# Patient Record
Sex: MALE | Race: WHITE
[De-identification: names, ages, dates, MRNs, and addresses within clinical notes are randomized per-mention and may not be internally consistent; named-entity substitution may affect disease eponyms.]

---

## 2020-01-02 ENCOUNTER — HOSPITAL ENCOUNTER (EMERGENCY)
Dept: HOSPITAL 76 - ED | Age: 35
Discharge: HOME | End: 2020-01-02
Payer: COMMERCIAL

## 2020-01-02 VITALS — SYSTOLIC BLOOD PRESSURE: 147 MMHG | DIASTOLIC BLOOD PRESSURE: 80 MMHG

## 2020-01-02 DIAGNOSIS — J02.9: Primary | ICD-10-CM

## 2020-01-02 PROCEDURE — 87081 CULTURE SCREEN ONLY: CPT

## 2020-01-02 PROCEDURE — 87661 TRICHOMONAS VAGINALIS AMPLIF: CPT

## 2020-01-02 PROCEDURE — 81599 UNLISTED MAAA: CPT

## 2020-01-02 PROCEDURE — 87491 CHLMYD TRACH DNA AMP PROBE: CPT

## 2020-01-02 PROCEDURE — 87591 N.GONORRHOEAE DNA AMP PROB: CPT

## 2020-01-02 PROCEDURE — 99284 EMERGENCY DEPT VISIT MOD MDM: CPT

## 2020-01-02 PROCEDURE — 99283 EMERGENCY DEPT VISIT LOW MDM: CPT

## 2020-01-02 NOTE — ED PHYSICIAN DOCUMENTATION
PD HPI URI





- Stated complaint


Stated Complaint: SORE THROAT





- Chief complaint


Chief Complaint: Heent





- History obtained from


History obtained from: Patient





- History of Present Illness


Timing - onset: How many days ago (few)


Timing duration: Days (few)


Timing details: Gradual onset, Still present


Associated symptoms: Sore throat, Swollen nodes, Dry cough.  No: Fever, Nasal 

congestion, Rhinorrhea, Productive cough, NVD


Contributing factors: No: Sick contact (he has his son every other weekend, but 

his son had not had tonsillitis symptoms.)


Similar symptoms before: Has not had sx before





Review of Systems


Constitutional: denies: Fever, Chills


Nose: denies: Rhinorrhea / runny nose, Congestion


Throat: reports: Sore throat


Respiratory: reports: Cough.  denies: Dyspnea


GI: denies: Nausea, Vomiting





PD PAST MEDICAL HISTORY





- Past Medical History


Past Medical History: No





- Present Medications


Home Medications: 


                                Ambulatory Orders











 Medication  Instructions  Recorded  Confirmed


 


Cephalexin [Keflex] 1,000 mg PO BID #28 capsule 01/02/20 


 


Diphenhydramine HCl [Allergy 12.5 mg PO Q6H PRN #120 ml 01/02/20 





Relief]   


 


dexAMETHasone [Decadron] 4 mg PO DAILY #5 tablet 01/02/20 














- Allergies


Allergies/Adverse Reactions: 


                                    Allergies











Allergy/AdvReac Type Severity Reaction Status Date / Time


 


No Known Drug Allergies Allergy   Verified 01/02/20 09:05














PD ED PE NORMAL





- Vitals


Vital signs reviewed: Yes





- General


General: Alert and oriented X 3, Well developed/nourished





- HEENT


HEENT: Ears normal, Moist mucous membranes.  No: Pharynx benign (posterior 

pharynx, including edge of soft palatte, lower nasopharynx, and the tonsils too 

with exudate. No vesicles. )





- Neck


Neck: Supple, no meningeal sign, Other (anterior adenopathy. )





- Cardiac


Cardiac: RRR, No murmur





- Respiratory


Respiratory: Clear bilaterally





- Derm


Derm: Normal color, Warm and dry





Results





- Vitals


Vitals: 


                               Vital Signs - 24 hr











  01/02/20





  09:05


 


Temperature 36.4 C L


 


Heart Rate 86


 


Respiratory 16





Rate 


 


Blood Pressure 147/80 H


 


O2 Saturation 100








                                     Oxygen











O2 Source                      Room air

















PD MEDICAL DECISION MAKING





- ED course


Complexity details: considered differential (diffusely exudative, not just 

tonsils, and given age demographic, I did GC culture as well. ), d/w patient





Departure





- Departure


Disposition: 01 Home, Self Care


Clinical Impression: 


 Exudative pharyngitis





Condition: Stable


Record reviewed to determine appropriate education?: Yes


Instructions:  ED Strep Pharyngitis Poss


Follow-Up: 


[facility] [place] [Provider Group]


Prescriptions: 


Cephalexin [Keflex] 1,000 mg PO BID #28 capsule


dexAMETHasone [Decadron] 4 mg PO DAILY #5 tablet


Diphenhydramine HCl [Allergy Relief] 12.5 mg PO Q6H PRN #120 ml


 PRN Reason: Allergy Symptoms


Comments: 


This looks to be likely bacterial such as strep.  Take cephalexin twice daily 

for a week as directed.  Decadron steroid for inflammation daily for 5 more 

days.  Use Tylenol or ibuprofen if needed for pains.  You can also use 

diphenhydramine liquid swish and swallow to help numb the throat and decreased 

pain as well.





We did do a culture of the throat which will result in 1 to 2 days and will call

you if there is any other type infections present.


Discharge Date/Time: 01/02/20 10:25

## 2020-01-05 ENCOUNTER — HOSPITAL ENCOUNTER (EMERGENCY)
Dept: HOSPITAL 76 - ED | Age: 35
Discharge: HOME | End: 2020-01-05
Payer: COMMERCIAL

## 2020-01-05 VITALS — DIASTOLIC BLOOD PRESSURE: 75 MMHG | SYSTOLIC BLOOD PRESSURE: 147 MMHG

## 2020-01-05 DIAGNOSIS — B34.9: Primary | ICD-10-CM

## 2020-01-05 PROCEDURE — 99284 EMERGENCY DEPT VISIT MOD MDM: CPT

## 2020-01-05 PROCEDURE — 93005 ELECTROCARDIOGRAM TRACING: CPT

## 2020-01-05 PROCEDURE — 99283 EMERGENCY DEPT VISIT LOW MDM: CPT

## 2020-01-05 NOTE — ED PHYSICIAN DOCUMENTATION
History of Present Illness





- Stated complaint


Stated Complaint: COUGH, CP - DX STREP 1/2/20





- Chief complaint


Chief Complaint: Resp





- History obtained from


History obtained from: Patient





- History of Present Illness


Timing: Today


Pain level max: 5


Pain level now: 4





- Additonal information


Additional information: 





34-year-old male states that he was diagnosed with strep pharyngitis a few days 

ago and placed on Keflex.  Over the last 2 days has noticed an aching in his 

chest.  Worse with movement and better with rest.  States it hurts to cough as 

well.  He states that his throat feels better, but it feels "dry".  No fevers.  

No breathing difficulty.  No nausea or vomiting. has not taken anything for the 

pain





Review of Systems


Constitutional: denies: Fever, Chills


GI: denies: Vomiting, Diarrhea


Skin: denies: Rash


Musculoskeletal: denies: Neck pain, Back pain


Neurologic: denies: Headache





PD PAST MEDICAL HISTORY





- Past Medical History


Past Medical History: Yes


Cardiovascular: None


Respiratory: None


Neuro: None


Endocrine/Autoimmune: None


GI: None


: None


HEENT: None


Psych: None


Musculoskeletal: Chronic back pain


Derm: None





- Past Surgical History


Past Surgical History: Yes


Ortho: Spine surgery





- Present Medications


Home Medications: 


                                Ambulatory Orders











 Medication  Instructions  Recorded  Confirmed


 


Cephalexin [Keflex] 1,000 mg PO BID #28 capsule 01/02/20 


 


Diphenhydramine HCl [Allergy 12.5 mg PO Q6H PRN #120 ml 01/02/20 





Relief]   


 


dexAMETHasone [Decadron] 4 mg PO DAILY #5 tablet 01/02/20 


 


Ibuprofen [Motrin] 800 mg PO Q8H PRN #30 tablet 01/05/20 














- Allergies


Allergies/Adverse Reactions: 


                                    Allergies











Allergy/AdvReac Type Severity Reaction Status Date / Time


 


No Known Drug Allergies Allergy   Verified 01/05/20 18:03














- Social History


Does the pt smoke?: No


Smoking Status: Never smoker


Does the pt drink ETOH?: Yes


Does the pt have substance abuse?: No





- Immunizations


Immunizations are current?: Yes





- POLST


Patient has POLST: No





PD ED PE NORMAL





- Vitals


Vital signs reviewed: Yes





- General


General: Alert and oriented X 3, No acute distress





- HEENT


HEENT: Moist mucous membranes





- Neck


Neck: Supple, no meningeal sign





- Cardiac


Cardiac: RRR, Strong equal pulses





- Respiratory


Respiratory: No respiratory distress, Clear bilaterally





- Abdomen


Abdomen: Soft, Non tender, Non distended





- Back


Back: No spinal TTP





- Derm


Derm: Warm and dry, No rash





- Extremities


Extremities: No edema, No calf tenderness / cord





- Neuro


Neuro: Alert and oriented X 3





- Psych


Psych: Normal mood, Normal affect





Results





- Vitals


Vitals: 


                               Vital Signs - 24 hr











  01/05/20





  18:03


 


Temperature 36.7 C


 


Heart Rate 62


 


Respiratory 15





Rate 


 


Blood Pressure 147/75 H


 


O2 Saturation 100








                                     Oxygen











O2 Source                      Room air

















- EKG (time done)


  ** 1823


Rate: Rate (enter#) (59)


Rhythm: NSR


Axis: Normal


Intervals: Normal MN


QRS: Normal


Ischemia: Normal ST segments





PD MEDICAL DECISION MAKING





- ED course


Complexity details: considered differential, d/w patient


ED course: 





Patient with what appears to be a viral syndrome and likely chest wall pain.  

Will place on Motrin for home.  We will have him follow-up with his doctor for 

further care.  No EKG changes.  Patient counseled regarding signs and symptoms 

for which I believe and urgent re-evaluation would be necessary. Patient with 

good understanding of and agreement to plan and is comfortable going home at 

this time





This document was made in part using voice recognition software. While efforts 

are made to proofread this document, sound alike and grammatical errors may 

occur.





Departure





- Departure


Disposition: 01 Home, Self Care


Clinical Impression: 


 Viral syndrome





Condition: Good


Instructions:  ED Viral Syndrome


Follow-Up: 


your,doctor in 1 week [Other]


Prescriptions: 


Ibuprofen [Motrin] 800 mg PO Q8H PRN #30 tablet


 PRN Reason: PAIN &/OR FEVER


Comments: 


Follow up with your doctor for further care. Return if you worsen. this should 

improve over the next few days.

## 2020-06-26 ENCOUNTER — HOSPITAL ENCOUNTER (OUTPATIENT)
Dept: HOSPITAL 76 - DI | Age: 35
Discharge: HOME | End: 2020-06-26
Attending: FAMILY MEDICINE
Payer: COMMERCIAL

## 2020-06-26 DIAGNOSIS — M50.322: Primary | ICD-10-CM

## 2020-06-26 PROCEDURE — 72141 MRI NECK SPINE W/O DYE: CPT

## 2020-06-26 NOTE — MRI REPORT
PROCEDURE:  Cervical Spine W/O

 

INDICATIONS:  CERVICALGIA

 

TECHNIQUE:  

Noncontrast sagittal T1 spin echo and T2 fast spin echo, sagittal STIR, foraminal oblique sagittal T2
 fast spin echo, and axial gradient echo or T2 fast spin echo through the cervical spine.  

 

COMPARISON:  None.

 

FINDINGS:  

Image quality:   Motion artifact is noted.  

 

Alignment and Curvature:  There is normal bony alignment.  

 

Bone Marrow:  Marrow demonstrates normal overall signal.  

 

Spinal Cord:  Visualized spinal cord has normal size and signal.  No cerebellar tonsillar herniation.
  

 

Paraspinous Soft Tissues:  No paravertebral masses.  Prevertebral soft tissues are normal in thicknes
s.  

 

C2-C3:  No significant abnormality is seen.  

 

C3-C4:   The disc height is well-preserved. There is loss of disc signal seen.  Mild disc osteophyte 
complex is seen.   Mild facet hypertrophy is seen.  There is mild right-sided and mild to moderate le
ft-sided neuroforaminal narrowing seen.  Mild central canal narrowing is seen.  

 

C4-C5:  The disc height is well-preserved. There is loss of disc signal seen.  Mild disc osteophyte c
omplex is seen.  Mild to moderate facet hypertrophy is seen. Minimal to mild bilateral neuroforaminal
 narrowing is seen. No significant central canal narrowing is seen. 

 

C5-C6:   The disc height is well-preserved. There is loss of disc signal seen.  Mild to moderate disc
 osteophyte complex is seen, which is eccentric to the right. There is moderate bilateral facet hyper
trophy seen. There is moderate to severe right-sided and moderate left-sided neuroforaminal narrowing
 seen. Mild to moderate central canal narrowing is seen.

 

C6-C7:  The disc height and disc signal are relatively well-preserved.  Mild disc osteophyte complex 
is seen.   Mild facet hypertrophy is seen.   Mild bilateral neural foraminal narrowing is seen.   No 
central canal narrowing is seen.

 

C7-T1:  No significant abnormality is seen.

 

 

IMPRESSION:  

 

Cervical spine degenerative changes are seen, which are most prominent at C5-C6, where there is moder
ate to severe right-sided neuroforaminal narrowing and moderate left-sided neuroforaminal narrowing. 
Mild to moderate central canal narrowing can be seen at this level.

 

Milder degenerative changes are seen elsewhere.  

 

Reviewed by: Lennox Artis MD on 6/26/2020 10:21 AM AGATA

Approved by: Lennox Artis MD on 6/26/2020 10:21 AM AGATA

 

 

Station ID:  [id number]